# Patient Record
Sex: FEMALE | Race: WHITE | URBAN - METROPOLITAN AREA
[De-identification: names, ages, dates, MRNs, and addresses within clinical notes are randomized per-mention and may not be internally consistent; named-entity substitution may affect disease eponyms.]

---

## 2022-03-07 ENCOUNTER — TELEPHONE (OUTPATIENT)
Dept: OBSTETRICS AND GYNECOLOGY | Facility: CLINIC | Age: 32
End: 2022-03-07

## 2022-03-07 NOTE — TELEPHONE ENCOUNTER
"----- Message from Naomie Gamboa sent at 3/7/2022  1:38 PM CST -----  Contact: Summer with [facility], Birth Certicate, [phone]  GENERAL CALL BACK    What is the nature of the call?:    Abel Sarkar gave birth at [facility], and is requesting a live birth certificate with Dr. Julieta Barreto's signature on it. Summer with [facility] Birth Certificate dept has been trying to get documentation over for Dr. Barreto to sign, to [id number] [address] [address] "[place]," however the mail has been bouncing back. She'd like to know if there is a better way to go about this?    Contact Preference?:    Phone call [phone]    "

## 2022-03-28 ENCOUNTER — TELEPHONE (OUTPATIENT)
Dept: OBSTETRICS AND GYNECOLOGY | Facility: CLINIC | Age: 32
End: 2022-03-28

## 2022-03-28 NOTE — TELEPHONE ENCOUNTER
----- Message from La Nena Parsonsard sent at 3/28/2022  4:21 PM CDT -----  Contact: [facility]  Type: Patient Call Back         Who called: [facility]         What is the request in detail:  I have Summer from [facility] calling in regards to Abel Sarkar's live birth certificate request signed by Dr. Barreto; calling to speak; would like to know if Dr. Barreto would sign ; if not please send back; please advise           Best call back number: [phone] - Birth Certificate; Summer         Additional Information: Mention baby name is [email or web address]           Thank You